# Patient Record
Sex: MALE | Race: WHITE | NOT HISPANIC OR LATINO
[De-identification: names, ages, dates, MRNs, and addresses within clinical notes are randomized per-mention and may not be internally consistent; named-entity substitution may affect disease eponyms.]

---

## 2017-09-15 ENCOUNTER — MOBILE ON CALL (OUTPATIENT)
Age: 22
End: 2017-09-15

## 2017-09-30 ENCOUNTER — APPOINTMENT (OUTPATIENT)
Dept: OTOLARYNGOLOGY | Facility: CLINIC | Age: 22
End: 2017-09-30
Payer: COMMERCIAL

## 2017-09-30 VITALS
WEIGHT: 190 LBS | HEIGHT: 71 IN | HEART RATE: 67 BPM | BODY MASS INDEX: 26.6 KG/M2 | DIASTOLIC BLOOD PRESSURE: 62 MMHG | SYSTOLIC BLOOD PRESSURE: 98 MMHG

## 2017-09-30 PROCEDURE — 92504 EAR MICROSCOPY EXAMINATION: CPT

## 2017-09-30 PROCEDURE — 99213 OFFICE O/P EST LOW 20 MIN: CPT | Mod: 25

## 2018-12-16 ENCOUNTER — TRANSCRIPTION ENCOUNTER (OUTPATIENT)
Age: 23
End: 2018-12-16

## 2020-03-19 ENCOUNTER — EMERGENCY (EMERGENCY)
Facility: HOSPITAL | Age: 25
LOS: 1 days | Discharge: HOME | End: 2020-03-19
Attending: EMERGENCY MEDICINE
Payer: COMMERCIAL

## 2020-03-19 VITALS
DIASTOLIC BLOOD PRESSURE: 81 MMHG | SYSTOLIC BLOOD PRESSURE: 124 MMHG | HEART RATE: 96 BPM | RESPIRATION RATE: 18 BRPM | WEIGHT: 223.11 LBS | TEMPERATURE: 99 F | HEIGHT: 70 IN | OXYGEN SATURATION: 99 %

## 2020-03-19 DIAGNOSIS — Z98.89 OTHER SPECIFIED POSTPROCEDURAL STATES: Chronic | ICD-10-CM

## 2020-03-19 DIAGNOSIS — Z96.22 MYRINGOTOMY TUBE(S) STATUS: Chronic | ICD-10-CM

## 2020-03-19 LAB
ALBUMIN SERPL ELPH-MCNC: 4.8 G/DL — SIGNIFICANT CHANGE UP (ref 3.3–5)
ALP SERPL-CCNC: 82 U/L — SIGNIFICANT CHANGE UP (ref 40–120)
ALT FLD-CCNC: 15 U/L — SIGNIFICANT CHANGE UP (ref 10–45)
ANION GAP SERPL CALC-SCNC: 14 MMOL/L — SIGNIFICANT CHANGE UP (ref 5–17)
AST SERPL-CCNC: 31 U/L — SIGNIFICANT CHANGE UP (ref 10–40)
BASOPHILS # BLD AUTO: 0.02 K/UL — SIGNIFICANT CHANGE UP (ref 0–0.2)
BASOPHILS NFR BLD AUTO: 0.3 % — SIGNIFICANT CHANGE UP (ref 0–2)
BILIRUB SERPL-MCNC: 0.3 MG/DL — SIGNIFICANT CHANGE UP (ref 0.2–1.2)
BUN SERPL-MCNC: 12 MG/DL — SIGNIFICANT CHANGE UP (ref 7–23)
CALCIUM SERPL-MCNC: 9.5 MG/DL — SIGNIFICANT CHANGE UP (ref 8.4–10.5)
CHLORIDE SERPL-SCNC: 103 MMOL/L — SIGNIFICANT CHANGE UP (ref 96–108)
CO2 SERPL-SCNC: 24 MMOL/L — SIGNIFICANT CHANGE UP (ref 22–31)
CREAT SERPL-MCNC: 0.81 MG/DL — SIGNIFICANT CHANGE UP (ref 0.5–1.3)
EOSINOPHIL # BLD AUTO: 0.09 K/UL — SIGNIFICANT CHANGE UP (ref 0–0.5)
EOSINOPHIL NFR BLD AUTO: 1.2 % — SIGNIFICANT CHANGE UP (ref 0–6)
ERYTHROCYTE [SEDIMENTATION RATE] IN BLOOD: 2 MM/HR — SIGNIFICANT CHANGE UP (ref 0–15)
GLUCOSE SERPL-MCNC: 94 MG/DL — SIGNIFICANT CHANGE UP (ref 70–99)
HCT VFR BLD CALC: 49.8 % — SIGNIFICANT CHANGE UP (ref 39–50)
HGB BLD-MCNC: 16.8 G/DL — SIGNIFICANT CHANGE UP (ref 13–17)
IMM GRANULOCYTES NFR BLD AUTO: 0.4 % — SIGNIFICANT CHANGE UP (ref 0–1.5)
LYMPHOCYTES # BLD AUTO: 1.44 K/UL — SIGNIFICANT CHANGE UP (ref 1–3.3)
LYMPHOCYTES # BLD AUTO: 19.7 % — SIGNIFICANT CHANGE UP (ref 13–44)
MCHC RBC-ENTMCNC: 32.2 PG — SIGNIFICANT CHANGE UP (ref 27–34)
MCHC RBC-ENTMCNC: 33.7 GM/DL — SIGNIFICANT CHANGE UP (ref 32–36)
MCV RBC AUTO: 95.4 FL — SIGNIFICANT CHANGE UP (ref 80–100)
MONOCYTES # BLD AUTO: 0.56 K/UL — SIGNIFICANT CHANGE UP (ref 0–0.9)
MONOCYTES NFR BLD AUTO: 7.7 % — SIGNIFICANT CHANGE UP (ref 2–14)
NEUTROPHILS # BLD AUTO: 5.17 K/UL — SIGNIFICANT CHANGE UP (ref 1.8–7.4)
NEUTROPHILS NFR BLD AUTO: 70.7 % — SIGNIFICANT CHANGE UP (ref 43–77)
NRBC # BLD: 0 /100 WBCS — SIGNIFICANT CHANGE UP (ref 0–0)
PLATELET # BLD AUTO: 211 K/UL — SIGNIFICANT CHANGE UP (ref 150–400)
POTASSIUM SERPL-MCNC: 4.6 MMOL/L — SIGNIFICANT CHANGE UP (ref 3.5–5.3)
POTASSIUM SERPL-SCNC: 4.6 MMOL/L — SIGNIFICANT CHANGE UP (ref 3.5–5.3)
PROCALCITONIN SERPL-MCNC: 0.02 NG/ML — SIGNIFICANT CHANGE UP (ref 0.02–0.1)
PROT SERPL-MCNC: 7.5 G/DL — SIGNIFICANT CHANGE UP (ref 6–8.3)
RBC # BLD: 5.22 M/UL — SIGNIFICANT CHANGE UP (ref 4.2–5.8)
RBC # FLD: 11.9 % — SIGNIFICANT CHANGE UP (ref 10.3–14.5)
SODIUM SERPL-SCNC: 141 MMOL/L — SIGNIFICANT CHANGE UP (ref 135–145)
WBC # BLD: 7.31 K/UL — SIGNIFICANT CHANGE UP (ref 3.8–10.5)
WBC # FLD AUTO: 7.31 K/UL — SIGNIFICANT CHANGE UP (ref 3.8–10.5)

## 2020-03-19 PROCEDURE — 99220: CPT

## 2020-03-19 PROCEDURE — 70481 CT ORBIT/EAR/FOSSA W/DYE: CPT | Mod: 26

## 2020-03-19 RX ORDER — SODIUM CHLORIDE 9 MG/ML
1000 INJECTION, SOLUTION INTRAVENOUS
Refills: 0 | Status: DISCONTINUED | OUTPATIENT
Start: 2020-03-19 | End: 2020-03-23

## 2020-03-19 RX ADMIN — SODIUM CHLORIDE 150 MILLILITER(S): 9 INJECTION, SOLUTION INTRAVENOUS at 20:50

## 2020-03-19 NOTE — ED ADULT NURSE NOTE - OBJECTIVE STATEMENT
24 y M arrived to the ED c/o L ear pain. Pt states " I have been having L ear pain, with drainage behind the ear, and hearing loss. I have been taking abx for this and has had this complaint for 9 days." PMH Left tympanomastoidectomy in 2015, cholesteatoma w/excision (2015), myringotomy. Pt denies chest pain, SOB, HA, N/V/D, abdominal pain, fever/chills, urinary symptoms, hematuria, weakness, dizziness, numbness, tinging. Peripheral pulses present b/l. Skin warm, dry and pink.

## 2020-03-19 NOTE — ED PROVIDER NOTE - OBJECTIVE STATEMENT
24y m PMHx cholesteatoma w/excision (2015), myringotomy p/w left ear pain/discharge. Pt reports left ear pain, loss of hearing and discharge from behind his left ear. The drainage began spontaneously, without preceding trauma he recalls. He was given Ofloxacin and Augmentin, she has been compliant with for 9 days. He had a left tympanomastoidectomy in 2015 ([facility] Harp). Pt declining pain meds in ED. Pt denies headache, fever, chills, congestion, runny nose, dizziness, vertigo, difficulty swallowing.

## 2020-03-19 NOTE — ED PROVIDER NOTE - CLINICAL SUMMARY MEDICAL DECISION MAKING FREE TEXT BOX
see MD note see MD note    Tympanomastoidectomy with ossiculoplasty  08/21/2015  -- CHRISTEN lopez/ Dr Harp

## 2020-03-19 NOTE — ED PROVIDER NOTE - PROGRESS NOTE DETAILS
Case endorsed to CDU ROSA Arrieta - pt may be appropriate CDU candidate for mastoiditis. ENT consulted. Labs/IVF/CT pending. - Sergey Estrada PA-C Case d/w ENT - aware of pt will see in ED - Sergey Estrada PA-C D/w ENT - pt was seen and evaluated by Dr Fu - recommended DELLA taylor, CDU for observation - Sergey Estrada PA-C

## 2020-03-19 NOTE — ED ADULT NURSE NOTE - NSIMPLEMENTINTERV_GEN_ALL_ED
Implemented All Universal Safety Interventions:  [place] to call system. Call bell, personal items and telephone within reach. Instruct patient to call for assistance. Room bathroom lighting operational. Non-slip footwear when patient is off stretcher. Physically safe environment: no spills, clutter or unnecessary equipment. Stretcher in lowest position, wheels locked, appropriate side rails in place.

## 2020-03-19 NOTE — ED PROVIDER NOTE - ATTENDING CONTRIBUTION TO CARE
------------ATTENDING NOTE------------   pt w/ sister c/o one week of increased redness/swelling and moderate ache behind L ear, now w/ spontaneous draining, complicated as past infection/surgery to area (? mastoiditis), has been on Augmentin and Cipro Otic gtt for past 4 days, no fevers, no additional complaints, awaiting labs/imaging and ENT consult -->  - Jessee Ruiz MD   ---------------------------------------------- ------------ATTENDING NOTE------------   pt w/ sister c/o one week of increased redness/swelling and moderate ache behind L ear, now w/ spontaneous draining, complicated as past infection/surgery to area (cholesteatoma), has been on Augmentin and Cipro Otic gtt for past 4 days, no fevers, no additional complaints, awaiting labs/imaging and ENT consult -->  - Jessee Ruiz MD   ---------------------------------------------- ------------ATTENDING NOTE------------   pt w/ sister c/o one week of increased redness/swelling and moderate ache behind L ear, now w/ spontaneous draining, complicated as past infection/surgery to area (cholesteatoma), has been on Augmentin and Cipro Otic gtt for past 9 days w/ worsening symptoms, no fevers, no additional complaints, awaiting labs/imaging and ENT consult -->  - Jessee Ruiz MD   ----------------------------------------------

## 2020-03-20 VITALS
TEMPERATURE: 98 F | HEART RATE: 78 BPM | DIASTOLIC BLOOD PRESSURE: 64 MMHG | SYSTOLIC BLOOD PRESSURE: 100 MMHG | OXYGEN SATURATION: 98 % | RESPIRATION RATE: 18 BRPM

## 2020-03-20 LAB
ANION GAP SERPL CALC-SCNC: 12 MMOL/L — SIGNIFICANT CHANGE UP (ref 5–17)
ANION GAP SERPL CALC-SCNC: 15 MMOL/L — SIGNIFICANT CHANGE UP (ref 5–17)
BUN SERPL-MCNC: 10 MG/DL — SIGNIFICANT CHANGE UP (ref 7–23)
BUN SERPL-MCNC: 12 MG/DL — SIGNIFICANT CHANGE UP (ref 7–23)
CALCIUM SERPL-MCNC: 8.3 MG/DL — LOW (ref 8.4–10.5)
CALCIUM SERPL-MCNC: 8.8 MG/DL — SIGNIFICANT CHANGE UP (ref 8.4–10.5)
CHLORIDE SERPL-SCNC: 101 MMOL/L — SIGNIFICANT CHANGE UP (ref 96–108)
CHLORIDE SERPL-SCNC: 105 MMOL/L — SIGNIFICANT CHANGE UP (ref 96–108)
CO2 SERPL-SCNC: 21 MMOL/L — LOW (ref 22–31)
CO2 SERPL-SCNC: 25 MMOL/L — SIGNIFICANT CHANGE UP (ref 22–31)
CREAT SERPL-MCNC: 0.64 MG/DL — SIGNIFICANT CHANGE UP (ref 0.5–1.3)
CREAT SERPL-MCNC: 0.78 MG/DL — SIGNIFICANT CHANGE UP (ref 0.5–1.3)
CRP SERPL-MCNC: <0.1 MG/DL — SIGNIFICANT CHANGE UP (ref 0–0.4)
GLUCOSE SERPL-MCNC: 281 MG/DL — HIGH (ref 70–99)
GLUCOSE SERPL-MCNC: 95 MG/DL — SIGNIFICANT CHANGE UP (ref 70–99)
POTASSIUM SERPL-MCNC: 3.7 MMOL/L — SIGNIFICANT CHANGE UP (ref 3.5–5.3)
POTASSIUM SERPL-MCNC: 3.9 MMOL/L — SIGNIFICANT CHANGE UP (ref 3.5–5.3)
POTASSIUM SERPL-SCNC: 3.7 MMOL/L — SIGNIFICANT CHANGE UP (ref 3.5–5.3)
POTASSIUM SERPL-SCNC: 3.9 MMOL/L — SIGNIFICANT CHANGE UP (ref 3.5–5.3)
SODIUM SERPL-SCNC: 137 MMOL/L — SIGNIFICANT CHANGE UP (ref 135–145)
SODIUM SERPL-SCNC: 142 MMOL/L — SIGNIFICANT CHANGE UP (ref 135–145)

## 2020-03-20 PROCEDURE — 80048 BASIC METABOLIC PNL TOTAL CA: CPT

## 2020-03-20 PROCEDURE — G0378: CPT

## 2020-03-20 PROCEDURE — 31233 NSL/SINS NDSC DX MAX SINUSC: CPT

## 2020-03-20 PROCEDURE — 99285 EMERGENCY DEPT VISIT HI MDM: CPT | Mod: 25

## 2020-03-20 PROCEDURE — 31575 DIAGNOSTIC LARYNGOSCOPY: CPT

## 2020-03-20 PROCEDURE — 85652 RBC SED RATE AUTOMATED: CPT

## 2020-03-20 PROCEDURE — 85027 COMPLETE CBC AUTOMATED: CPT

## 2020-03-20 PROCEDURE — 70481 CT ORBIT/EAR/FOSSA W/DYE: CPT

## 2020-03-20 PROCEDURE — 86140 C-REACTIVE PROTEIN: CPT

## 2020-03-20 PROCEDURE — 80053 COMPREHEN METABOLIC PANEL: CPT

## 2020-03-20 PROCEDURE — 96365 THER/PROPH/DIAG IV INF INIT: CPT

## 2020-03-20 PROCEDURE — 99217: CPT

## 2020-03-20 PROCEDURE — 84145 PROCALCITONIN (PCT): CPT

## 2020-03-20 PROCEDURE — 96375 TX/PRO/DX INJ NEW DRUG ADDON: CPT | Mod: XU

## 2020-03-20 PROCEDURE — 96376 TX/PRO/DX INJ SAME DRUG ADON: CPT | Mod: XU

## 2020-03-20 PROCEDURE — 96374 THER/PROPH/DIAG INJ IV PUSH: CPT | Mod: XU

## 2020-03-20 RX ORDER — CIPROFLOXACIN AND DEXAMETHASONE 3; 1 MG/ML; MG/ML
4 SUSPENSION/ DROPS AURICULAR (OTIC)
Qty: 56 | Refills: 0
Start: 2020-03-20 | End: 2020-03-26

## 2020-03-20 RX ORDER — MOXIFLOXACIN HYDROCHLORIDE TABLETS, 400 MG 400 MG/1
1 TABLET, FILM COATED ORAL
Qty: 14 | Refills: 0
Start: 2020-03-20 | End: 2020-03-26

## 2020-03-20 RX ORDER — OFLOXACIN OTIC SOLUTION 3 MG/ML
4 SOLUTION/ DROPS AURICULAR (OTIC)
Refills: 0 | Status: DISCONTINUED | OUTPATIENT
Start: 2020-03-20 | End: 2020-03-20

## 2020-03-20 RX ORDER — OFLOXACIN OTIC SOLUTION 3 MG/ML
4 SOLUTION/ DROPS AURICULAR (OTIC)
Refills: 0 | Status: DISCONTINUED | OUTPATIENT
Start: 2020-03-20 | End: 2020-03-23

## 2020-03-20 RX ORDER — CIPROFLOXACIN AND DEXAMETHASONE 3; 1 MG/ML; MG/ML
4 SUSPENSION/ DROPS AURICULAR (OTIC)
Refills: 0 | Status: DISCONTINUED | OUTPATIENT
Start: 2020-03-20 | End: 2020-03-23

## 2020-03-20 RX ADMIN — Medication 100 MILLIGRAM(S): at 20:14

## 2020-03-20 RX ADMIN — OFLOXACIN OTIC SOLUTION 4 DROP(S): 3 SOLUTION/ DROPS AURICULAR (OTIC) at 01:41

## 2020-03-20 RX ADMIN — SODIUM CHLORIDE 150 MILLILITER(S): 9 INJECTION, SOLUTION INTRAVENOUS at 08:29

## 2020-03-20 RX ADMIN — Medication 100 MILLIGRAM(S): at 12:29

## 2020-03-20 RX ADMIN — OFLOXACIN OTIC SOLUTION 4 DROP(S): 3 SOLUTION/ DROPS AURICULAR (OTIC) at 18:21

## 2020-03-20 RX ADMIN — SODIUM CHLORIDE 150 MILLILITER(S): 9 INJECTION, SOLUTION INTRAVENOUS at 20:14

## 2020-03-20 RX ADMIN — SODIUM CHLORIDE 1000 MILLILITER(S): 9 INJECTION, SOLUTION INTRAVENOUS at 20:00

## 2020-03-20 NOTE — CONSULT NOTE ADULT - ASSESSMENT
Assessment:  The patient is a 24 year old male with a past medical history significant for cholesteatoma w/excision (2015), myringotomy presents to the emergency room at [facility] with a chief complaint of left ear and face pain for the past several hours. Ddx: left otitis externa versus left otomastoiditis with TM perforation.    Plan:  1) levoflorxacin or clindamycin  2) ciprodex ear drops to left ear BID x 7 days  3) CDU for IV abx

## 2020-03-20 NOTE — CONSULT NOTE ADULT - ENT GEN HX ROS MEA POS PC
hearing difficulty/nasal discharge/post-nasal discharge/sinus symptoms/nasal congestion/dysphagia/nasal obstruction/recurrent cold sores/ear pain

## 2020-03-20 NOTE — ED CDU PROVIDER SUBSEQUENT DAY NOTE - PROGRESS NOTE DETAILS
Pt comfortable. No complaints. Vital signs stable. Will continue to observe and reassess. Awaiting Pt comfortable. No complaints. Vital signs stable. Will continue IV antibiotics and continue to observe and reassess.  -Mary Ellen Booth PA-C Pt comfortable. No complaints. Vital signs stable. +spontaneous purulent drainage from posterior L ear, +mild tenderness to L mastoid, L ear canal with purulent drainage, no visualization of TM.  Will continue IV antibiotics and continue to observe and reassess. ENT to re-evaluate.  -Mary Ellen Booth PA-C Pt comfortable. No complaints. Vital signs stable. Will continue to observe and reassess. Re-evaluated by ENT PA. Pt to continue IV antibiotics and ENT attending will come re-evaluate pt as well. -Mary Ellen Booth PA-C Pt comfortable. No complaints. Vital signs stable. Will continue to observe and reassess. Awaiting ENT Dr. Fu. -Mary Ellen Booth PA-C CDU NOTE ROSA PHILIP: Pt resting comfortably, feeling well without complaint. NAD, VSS. R ear normal, L ear TM perforated with pus, mastoid with abscess and minimal drainage of pus visible on dressing, + ttp. no cervical LAD b/l.  will continue antibiotics and monitoring. CDU NOTE ROSA PHILIP: ENT Dr. Fu saw pt, pt stable for discharge with cipro and ciprodex drops. case and plan discussed with Dr. Ferraro; agree with discharge.

## 2020-03-20 NOTE — ED ADULT NURSE REASSESSMENT NOTE - NS ED NURSE REASSESS COMMENT FT1
Report taken from Chiqui VILLARREAL. States patient had a good night with no complaints. Will continue to monitor.

## 2020-03-20 NOTE — ED CDU PROVIDER SUBSEQUENT DAY NOTE - ATTENDING CONTRIBUTION TO CARE
Attending MD Brandon:   I personally have seen and examined this patient.  Physician assistant note reviewed and agree on plan of care and except where noted.  See below for details.     Seen in CDU 8    24M with PMH/PSH including cholesteatoma of middle ear s/p excision (2015), (s/p tympanomastoidectomy in 2015, CHRISTEN Harp), s/p myringotomy sent to the CDU after presenting to the ED with L ear pain and discharge from behind L ear.  Review of EMR reveals patient reported Ofloxacin and Augmentin x 9 days prior to presenting to ED.  In ED, patient seen by Dr. Fu of ENT who recommended Levofloxacin or Clindamycin and Ciprodex ear drops to left ear BID x 7 days.  Patient CT of IAC revealed: 1.  1.2 x 1.3 cm superficial abscess at the left mastoid tip with associated left-sided otitis externa, 2. s/p left-sided canal wall up mastoidectomy with abnormal soft tissue completely filling the mastoidectomy bowl with associated erosive changes involving the bony external auditory canal, 3. s/p left-sided partial ossicular reconstruction procedure without evidence of prosthetic dislocation, 4. abnormal soft tissue within the left middle ear cavity with associated erosive changes involving the malleus. Additional bony thinning/questionable dehiscence of the left tegmen tympani. Findings are most reflective of changes related to chronic otitis media. Superimposed cholesteatoma formation is a possibility.  5. Chronic right-sided otitis media and/or chronic mastoiditis. No gross ossicular erosion on the right.  Patient without leukocytosis or elev ESR.  Patient pending re-eval by ENT later today.  Patient reports residual L ear pain.  Reports discharge from behind L ear.  Denies fevers, chills.  Denies abdominal pain, nausea, vomiting, diarrhea, blood in stools. Denies dysuria, hematuria, change in urinary habits including frequency, urgency.  Denies tobacco/ecig/vaping.  On exam, NAD, head NCAT, +spontaneously draining of purulent material from area posterior to the L ear, +mild tenderness to L mastoid, L ear with purulent drainage, unable to visualize beyond purulent drainage, PERRL, FROM at neck, no tenderness to midline palpation, no stepoffs along length of spine, lungs CTAB with good inspiratory effort, no wheezing, no rhonchi, no rales, +S1S2, no m/r/g, abdomen soft with +BS, NT, ND, no CVAT, moving all extremities; A/P: 24M with abscess spontaneously drainage and suspected L TM perf with purulent drainage, given ENT attdg recs will give Clinda and Rx Ciprodex, will await re-eval by ENT

## 2020-03-20 NOTE — ED CDU PROVIDER DISPOSITION NOTE - CLINICAL COURSE
24y m PMHx cholesteatoma w/excision (2015), myringotomy p/w left ear pain/discharge. Pt reports left ear pain, loss of hearing and discharge from behind his left ear. The drainage began spontaneously, without preceding trauma he recalls. He was given Ofloxacin and Augmentin, she has been compliant with for 9 days. He had a left tympanomastoidectomy in 2015 ([facility] Dr. Hapr). Pt declining pain meds in ED. Pt denies headache, fever, chills, congestion, runny nose, dizziness, vertigo, difficulty swallowing.     In ED pts labs unremarkable, pt seen by ENT Dr. Fu who recommended levaquin 750ng QD and ofloxacin drops 4 drops left ear BID and CT EAC. CT significant for inflammation of EAC, ? erosion of left ossicles, middles ear and mastoid opacification. Pt sent to CDU for IV abx, and reeval by ENT in the am.     In CDU_________. 24y m PMHx cholesteatoma w/excision (2015), myringotomy p/w left ear pain/discharge. Pt reports left ear pain, loss of hearing and discharge from behind his left ear. The drainage began spontaneously, without preceding trauma he recalls. He was given Ofloxacin and Augmentin, she has been compliant with for 9 days. He had a left tympanomastoidectomy in 2015 ([facility] Dr. Harp). Pt declining pain meds in ED. Pt denies headache, fever, chills, congestion, runny nose, dizziness, vertigo, difficulty swallowing.     In ED pts labs unremarkable, pt seen by ENT Dr. Fu who recommended levaquin 750ng QD and ofloxacin drops 4 drops left ear BID and CT EAC. CT significant for inflammation of EAC, ? erosion of left ossicles, middles ear and mastoid opacification. Pt sent to CDU for IV abx, and reeval by ENT in the am.     In CDU, pts pain improved, ENT Dr. Fu saw pt, pt stable for d/c on cipro and ciprodex.

## 2020-03-20 NOTE — CONSULT NOTE ADULT - NOSE
clear discharge/inflamed mucosa/congestion/nasal/sinus endoscopy: maxillary sinus with mucoid fluid bl via inferior meatus, ss clear b/l

## 2020-03-20 NOTE — ED CDU PROVIDER DISPOSITION NOTE - ATTENDING CONTRIBUTION TO CARE
Attending MD Brandon:   I personally have seen and examined this patient.  Physician assistant note reviewed and agree on plan of care and except where noted.  See below for details.     Seen in CDU 8    24M with PMH/PSH including cholesteatoma of middle ear s/p excision (2015), (s/p tympanomastoidectomy in 2015, CHRISTEN Harp), s/p myringotomy sent to the CDU after presenting to the ED with L ear pain and discharge from behind L ear.  Review of EMR reveals patient reported Ofloxacin and Augmentin x 9 days prior to presenting to ED.  In ED, patient seen by Dr. Fu of ENT who recommended Levofloxacin 750mg daily, Ofloxacin gtts BID.  Patient CT of IAC revealed: 1.  1.2 x 1.3 cm superficial abscess at the left mastoid tip with associated left-sided otitis externa, 2. s/p left-sided canal wall up mastoidectomy with abnormal soft tissue completely filling the mastoidectomy bowl with associated erosive changes involving the bony external auditory canal, 3. s/p left-sided partial ossicular reconstruction procedure without evidence of prosthetic dislocation, 4. abnormal soft tissue within the left middle ear cavity with associated erosive changes involving the malleus. Additional bony thinning/questionable dehiscence of the left tegmen tympani. Findings are most reflective of changes related to chronic otitis media. Superimposed cholesteatoma formation is a possibility.  5. Chronic right-sided otitis media and/or chronic mastoiditis. No gross ossicular erosion on the right.  Patient without leukocytosis or elev ESR.  Patient pending re-eval by ENT later today.    TO BE COMPLETED Attending MD Brandon:   I personally have seen and examined this patient.  Physician assistant note reviewed and agree on plan of care and except where noted.  See below for details.     Seen in CDU 8    24M with PMH/PSH including cholesteatoma of middle ear s/p excision (2015), (s/p tympanomastoidectomy in 2015, CHRISTEN Harp), s/p myringotomy sent to the CDU after presenting to the ED with L ear pain and discharge from behind L ear.  Review of EMR reveals patient reported Ofloxacin and Augmentin x 9 days prior to presenting to ED.  In ED, patient seen by Dr. Fu of ENT who recommended Levofloxacin or Clindamycin and Ciprodex ear drops to left ear BID x 7 days.  Patient CT of IAC revealed: 1.  1.2 x 1.3 cm superficial abscess at the left mastoid tip with associated left-sided otitis externa, 2. s/p left-sided canal wall up mastoidectomy with abnormal soft tissue completely filling the mastoidectomy bowl with associated erosive changes involving the bony external auditory canal, 3. s/p left-sided partial ossicular reconstruction procedure without evidence of prosthetic dislocation, 4. abnormal soft tissue within the left middle ear cavity with associated erosive changes involving the malleus. Additional bony thinning/questionable dehiscence of the left tegmen tympani. Findings are most reflective of changes related to chronic otitis media. Superimposed cholesteatoma formation is a possibility.  5. Chronic right-sided otitis media and/or chronic mastoiditis. No gross ossicular erosion on the right.  Patient without leukocytosis or elev ESR.  Patient pending re-eval by ENT later today.    TO BE COMPLETED Attending MD Brandon:   I personally have seen and examined this patient.  Physician assistant note reviewed and agree on plan of care and except where noted.  See below for details.     Seen in CDU 8    24M with PMH/PSH including cholesteatoma of middle ear s/p excision (2015), (s/p tympanomastoidectomy in 2015, CHRISTEN Harp), s/p myringotomy sent to the CDU after presenting to the ED with L ear pain and discharge from behind L ear.  Review of EMR reveals patient reported Ofloxacin and Augmentin x 9 days prior to presenting to ED.  In ED, patient seen by Dr. Fu of ENT who recommended Levofloxacin or Clindamycin and Ciprodex ear drops to left ear BID x 7 days.  Patient CT of IAC revealed: 1.  1.2 x 1.3 cm superficial abscess at the left mastoid tip with associated left-sided otitis externa, 2. s/p left-sided canal wall up mastoidectomy with abnormal soft tissue completely filling the mastoidectomy bowl with associated erosive changes involving the bony external auditory canal, 3. s/p left-sided partial ossicular reconstruction procedure without evidence of prosthetic dislocation, 4. abnormal soft tissue within the left middle ear cavity with associated erosive changes involving the malleus. Additional bony thinning/questionable dehiscence of the left tegmen tympani. Findings are most reflective of changes related to chronic otitis media. Superimposed cholesteatoma formation is a possibility.  5. Chronic right-sided otitis media and/or chronic mastoiditis. No gross ossicular erosion on the right.  Patient without leukocytosis or elev ESR.  Patient pending re-eval by ENT later today.  Patient reports residual L ear pain.  Reports discharge from behind L ear.  Denies fevers, chills.  Denies abdominal pain, nausea, vomiting, diarrhea, blood in stools. Denies dysuria, hematuria, change in urinary habits including frequency, urgency.  On exam, NAD, head NCAT, +spontaneously draining of purulent material from area posterior to the L ear, +mild tenderness to L mastoid, L ear with purulent drainage, unable to visualize beyond purulent drainage, PERRL, FROM at neck, no tenderness to midline palpation, no stepoffs along length of spine, lungs CTAB with good inspiratory effort, no wheezing, no rhonchi, no rales, +S1S2, no m/r/g, abdomen soft with +BS, NT, ND, no CVAT, moving all extremities; A/P: 24M with abscess spontaneously drainage and suspected L TM perf with purulent drainage,     TO BE COMPLETED Attending MD Brandon:   I personally have seen and examined this patient.  Physician assistant note reviewed and agree on plan of care and except where noted.  See below for details.     Seen in CDU 8    24M with PMH/PSH including cholesteatoma of middle ear s/p excision (2015), (s/p tympanomastoidectomy in 2015, CHRISTEN Harp), s/p myringotomy sent to the CDU after presenting to the ED with L ear pain and discharge from behind L ear.  Review of EMR reveals patient reported Ofloxacin and Augmentin x 9 days prior to presenting to ED.  In ED, patient seen by Dr. Fu of ENT who recommended Levofloxacin or Clindamycin and Ciprodex ear drops to left ear BID x 7 days.  Patient CT of IAC revealed: 1.  1.2 x 1.3 cm superficial abscess at the left mastoid tip with associated left-sided otitis externa, 2. s/p left-sided canal wall up mastoidectomy with abnormal soft tissue completely filling the mastoidectomy bowl with associated erosive changes involving the bony external auditory canal, 3. s/p left-sided partial ossicular reconstruction procedure without evidence of prosthetic dislocation, 4. abnormal soft tissue within the left middle ear cavity with associated erosive changes involving the malleus. Additional bony thinning/questionable dehiscence of the left tegmen tympani. Findings are most reflective of changes related to chronic otitis media. Superimposed cholesteatoma formation is a possibility.  5. Chronic right-sided otitis media and/or chronic mastoiditis. No gross ossicular erosion on the right.  Patient without leukocytosis or elev ESR.  Patient pending re-eval by ENT later today.  Patient reports residual L ear pain.  Reports discharge from behind L ear.  Denies fevers, chills.  Denies abdominal pain, nausea, vomiting, diarrhea, blood in stools. Denies dysuria, hematuria, change in urinary habits including frequency, urgency.  On exam, NAD, head NCAT, +spontaneously draining of purulent material from area posterior to the L ear, +mild tenderness to L mastoid, L ear with purulent drainage, unable to visualize beyond purulent drainage, PERRL, FROM at neck, no tenderness to midline palpation, no stepoffs along length of spine, lungs CTAB with good inspiratory effort, no wheezing, no rhonchi, no rales, +S1S2, no m/r/g, abdomen soft with +BS, NT, ND, no CVAT, moving all extremities; A/P: 24M with abscess spontaneously drainage and suspected L TM perf with purulent drainage, pending ENT re-eval I performed a history and physical exam of the patient and discussed their management with the Advanced Care Practitioner. I reviewed the ACP's note and agree with the documented findings and plan of care.

## 2020-03-20 NOTE — ED CDU PROVIDER INITIAL DAY NOTE - PROGRESS NOTE DETAILS
Spoke with Carlos Enrique, ENT, discussed CT findings which he states does not change possibility of pt being d/cd tomorrow. recommends ofloxacin drops to left ear 4 drops BID, and levaquin 750 QD, likely pt can go home tomorrow after reeval. will monitor closely. Pt resting comfortably. NAD. No complaints. VSS. will continue to monitor, pt without pain at this time, declining pain medications.

## 2020-03-20 NOTE — ED CDU PROVIDER DISPOSITION NOTE - NSFOLLOWUPINSTRUCTIONS_ED_ALL_ED_FT
- stay hydrated.   - take tylenol 975mg and ibuprofen 600mg every 6 hours as needed for pain-take with meals.  -take levaquin 750mg once a day as prescribed  -apply ofloxacin drops to left ear twice a day as prescribed.   - follow up with your pcp in 1-2 days.  - follow up with your ENT doctor this week  - return if symptoms worsen, fevers, weakness, numbness/tingling, blurred vision, difficulty ambulating and all other concerns. - stay hydrated.   - take tylenol 975mg and ibuprofen 600mg every 6 hours as needed for pain-take with meals.  -take antibiotic clindamycin as prescribed  -apply Ciprodex drops to left ear twice a day as prescribed.   - follow up with your medical doctor in 2-3 days.  - follow up with ENT specialist Dr. Fu in 2-3 days.  - return if symptoms worsen, fevers, weakness, numbness/tingling, blurred vision, difficulty ambulating and all other concerns. - stay hydrated.   - take tylenol 975mg and ibuprofen 600mg every 6 hours as needed for pain-take with meals.  - take antibiotic Cipro as prescribed  - apply Ciprodex drops to left ear twice a day as prescribed.   - follow up with your medical doctor in 2-3 days.  - follow up with ENT specialist Dr. Fu in 2-3 days.  - return if symptoms worsen, fevers, weakness, numbness/tingling, blurred vision, difficulty ambulating and all other concerns.

## 2020-03-20 NOTE — ED ADULT NURSE REASSESSMENT NOTE - COMFORT CARE
darkened lights/ambulated to bathroom/plan of care explained/meal provided/repositioned/warm blanket provided/po fluids offered
plan of care explained
plan of care explained/po fluids offered

## 2020-03-20 NOTE — CHART NOTE - NSCHARTNOTEFT_GEN_A_CORE
PROCEDURE NOTE - Drainage of Left Mastoid Abscess    Indication: Left Mastoid Abscess  	  Correct patient, procedure, site, positioning, and presence of correct supplies.    Procedure: Patient was positioned, prepped and draped in usual sterile fashion. Clamp inserted and maneuvered to break up any loculations. Manual pressure was used to remove the contents of the abscess cavity.     Blood loss: Minimal  Complications: None      ADDITIONAL IMAGING: CT IAC   IMPRESSION:     1. 1.2 x 1.3 cm superficial abscess at the left mastoid tip with associated   left-sided otitis externa.     2. Status post left-sided canal wall up mastoidectomy with abnormal soft   tissue completely filling the mastoidectomy bowl with associated erosive   changes involving the bony external auditory canal.     3. Status post left-sided partial ossicular reconstruction procedure without   evidence of prosthetic dislocation.     4. Abnormal soft tissue within the left middle ear cavity with associated   erosive changes involving the malleus. Additional bony thinning/questionable   dehiscence of the left tegmen tympani. Findings are most reflective of   changes related to chronic otitis media. Superimposed cholesteatoma   formation is a possibility.     5. Chronic right-sided otitis media and/or chronic mastoiditis. No gross   ossicular erosion on the right.         Plan:  Continue IV ABX in CDU   Dr. Fu will be back tonight to re-evaluate the pt

## 2020-03-20 NOTE — ED ADULT NURSE REASSESSMENT NOTE - NS ED NURSE REASSESS COMMENT FT1
Pt received from CHERELLE Haywood. Pt oriented to CDU & plan of care was discussed. Pt endoses 3/10 L ear pain. Swelling noted but no drainage noted. Open wound noted behind L ear. Safety & comfort measures maintained. Call bell in reach. Will continue to monitor.

## 2020-03-20 NOTE — ED ADULT NURSE REASSESSMENT NOTE - NS ED NURSE REASSESS COMMENT FT1
Received pt from CHERELLE Arenas , received pt alert and responsive, oriented x4, denies any respiratory distress, SOB, or difficulty breathing. Pt transferred to CDU for L ear decreased hearing and drainage., IV in place, patent and free of signs of infiltration, placed on continuos cardiac monitoring as ordered, NSR HR in the,  pt denies chest pain or palpitations, V/S stable, pt afebrile, pt denies pain at this time. Pt educated on unit and unit rules, instructed patient to notify RN of any needed assistance, Pt verbalizes understanding, Call bell placed within reach. Safety maintained. Will continue to monitor. Received pt from CHERELLE Arenas , received pt alert and responsive, oriented x4, denies any respiratory distress, SOB, or difficulty breathing. Pt transferred to CDU for L ear pain, L posterior ear drainage and decreased hearing to L ear. Currently pain is 1/10 to L inner ear, pt declined pain medication at this time, will reassess. Placed on lactated ringers at 150, pt tolerating well. Pending IV clindamycin q8 pt aware. IV in place, patent and free of signs of infiltration, V/S stable, pt afebrile. Pt educated on unit and unit rules, instructed patient to notify RN of any needed assistance, Pt verbalizes understanding, Call bell placed within reach. Safety maintained. Will continue to monitor.

## 2020-03-20 NOTE — CONSULT NOTE ADULT - COMMENTS
Vital Signs Last 24 Hrs  T(C): 36.5 (20 Mar 2020 04:38), Max: 37 (19 Mar 2020 19:27)  T(F): 97.7 (20 Mar 2020 04:38), Max: 98.6 (19 Mar 2020 19:27)  HR: 84 (20 Mar 2020 04:38) (80 - 96)  BP: 106/68 (20 Mar 2020 04:38) (106/68 - 124/81)  BP(mean): --  RR: 18 (20 Mar 2020 04:38) (16 - 18)  SpO2: 98% (20 Mar 2020 04:38) (97% - 99%)

## 2020-03-20 NOTE — ED CDU PROVIDER DISPOSITION NOTE - PATIENT PORTAL LINK FT
You can access the FollowMyHealth Patient Portal offered by [facility] by registering at the following website: [email or web address]. By joining [facility]’s FollowMyHealth portal, you will also be able to view your health information using other applications (apps) compatible with our system.

## 2020-03-20 NOTE — ED ADULT NURSE REASSESSMENT NOTE - NS ED NURSE REASSESS COMMENT FT1
Pt d/c home per CHRIS Recinos no complaints, IV lock removed, d/c paperwork given to pt  by PA. d/c paperwork given to pt, ambulated out of unit independently.

## 2020-03-20 NOTE — ED CDU PROVIDER INITIAL DAY NOTE - OBJECTIVE STATEMENT
24y m PMHx cholesteatoma w/excision (2015), myringotomy p/w left ear pain/discharge. Pt reports left ear pain, loss of hearing and discharge from behind his left ear. The drainage began spontaneously, without preceding trauma he recalls. He was given Ofloxacin and Augmentin, she has been compliant with for 9 days. He had a left tympanomastoidectomy in 2015 (CHRISTEN Harp). Pt declining pain meds in ED. Pt denies headache, fever, chills, congestion, runny nose, dizziness, vertigo, difficulty swallowing. 24y m PMHx cholesteatoma w/excision (2015), myringotomy p/w left ear pain/discharge. Pt reports left ear pain, loss of hearing and discharge from behind his left ear. The drainage began spontaneously, without preceding trauma he recalls. He was given Ofloxacin and Augmentin, she has been compliant with for 9 days. He had a left tympanomastoidectomy in 2015 ([facility] Dr. Harp). Pt declining pain meds in ED. Pt denies headache, fever, chills, congestion, runny nose, dizziness, vertigo, difficulty swallowing.     In ED pts labs unremarkable, pt seen by ENT Dr. Fu who recommended levaquin 750ng QD and ofloxacin drops 4 drops left ear BID and CT EAC. CT significant for inflammation of EAC, ? erosion of left ossicles, middles ear and mastoid opacification. Pt sent to CDU for IV abx, and reeval by ENT in the am.

## 2020-03-22 RX ORDER — OFLOXACIN OTIC SOLUTION 3 MG/ML
5 SOLUTION/ DROPS AURICULAR (OTIC)
Qty: 1 | Refills: 0
Start: 2020-03-22 | End: 2020-03-31

## 2020-03-27 ENCOUNTER — APPOINTMENT (OUTPATIENT)
Dept: OTOLARYNGOLOGY | Facility: CLINIC | Age: 25
End: 2020-03-27
Payer: COMMERCIAL

## 2020-03-27 VITALS
WEIGHT: 220 LBS | BODY MASS INDEX: 30.8 KG/M2 | HEART RATE: 87 BPM | HEIGHT: 71 IN | DIASTOLIC BLOOD PRESSURE: 67 MMHG | SYSTOLIC BLOOD PRESSURE: 106 MMHG

## 2020-03-27 VITALS — TEMPERATURE: 97.6 F | BODY MASS INDEX: 26.6 KG/M2 | WEIGHT: 190 LBS | HEIGHT: 71 IN

## 2020-03-27 PROCEDURE — 99213 OFFICE O/P EST LOW 20 MIN: CPT

## 2020-03-27 NOTE — HISTORY OF PRESENT ILLNESS
[de-identified] : 25 y/o M, S/P Left Canal wall up mastoidectomy for Cholesteatoma in 2015 by Dr. Harp.  Last week had Mastoid abscess that was I&D'ed by Dr. Fu in ED at [facility].\par Now here for f/u.  Notes pain is much improved, only mild tenderness.  Has crusting over site, minimal d/c. Feels swelling is much improved.  No F/C/S.\par Currently on Cipro, today is last day.  Also using Ofloxacin gtts. Not wearing his hearing aids.\par Scan in hospital also suggests possible right cholesteatoma in addition to left infection.

## 2020-03-27 NOTE — PHYSICAL EXAM
[Normal] : mucosa is normal [Midline] : trachea located in midline position [de-identified] : left - I&D side over mastoid with large crusting that was cleaned off revealing small mount of granulation tissue with minimal purulence drainage on pressing around it, mastoid is nontender with no edema/erythema/fluctuance.  left eac filled with purulent discharge and cerumen debris; this was all suctioned clear to TM. canal with erythema and edema especially along posterior wall with possible fluctuant area, right canal clear [de-identified] : left TM was partially visualized and thickened/erythematous, right TM intact with an effusion and sclerosis/opacity along posterior superior quadrant ?cholesteotoma

## 2020-03-27 NOTE — REASON FOR VISIT
[Subsequent Evaluation] : a subsequent evaluation for [FreeTextEntry2] : F/U Mastoid abscess drained in ED

## 2020-03-27 NOTE — ASSESSMENT
[FreeTextEntry1] : Left mastoid abscess s/p I&D by Dr. Fu, healing well.  Continues to have d/c and acute OM on left.  \par - Suctioned left canal clear in office today and cleaned mastoid I&D site which is healing well\par - Culture taken from left ear\par - avoid HA in left ear but ok to wear in right ear\par - Continue Ofloxacin gtts\par - PO Cipro x 1 more week\par - F/U with Dr. Harp next week to re-eval, call if any fever/pain or new symptoms\par \par Right effusion with possible cholesteatoma\par - F/U with Dr. Harp\par

## 2020-03-31 ENCOUNTER — APPOINTMENT (OUTPATIENT)
Dept: OTOLARYNGOLOGY | Facility: CLINIC | Age: 25
End: 2020-03-31
Payer: COMMERCIAL

## 2020-03-31 VITALS — TEMPERATURE: 97.5 F

## 2020-03-31 PROCEDURE — 92504 EAR MICROSCOPY EXAMINATION: CPT

## 2020-03-31 PROCEDURE — 99213 OFFICE O/P EST LOW 20 MIN: CPT | Mod: 25

## 2020-03-31 NOTE — PROCEDURE
[Same] : same as the Pre Op Dx. [] : Binocular Microscopy [FreeTextEntry1] : L OE [FreeTextEntry4] : none [FreeTextEntry6] : Operative microscope was used to examine/treat the ear canal, ear drum and visible middle ear landmarks. Adequate exam/treatment would not have been possible without the use of a microscope. Findings are described.\par \par \par \par

## 2020-03-31 NOTE — PHYSICAL EXAM
[Binocular Microscopic Exam] : Binocular microscopic exam was performed [FreeTextEntry7] : postauricular abscess, draining purulence, expressed [FreeTextEntry9] : erythema, polyp; cauterized, wick placed with ciprodex [de-identified] : thick [Normal] : no rashes

## 2020-03-31 NOTE — HISTORY OF PRESENT ILLNESS
[de-identified] : 25 year old man follow up after ear infection. Bilateral hearing loss, usually wears bilateral hearing aids.  [facility] went to PCP 3/12/2020 and was prescribed oral antibiotics and ear drops for left otalgia.   [facility] went to [facility] for a mastoid abscess which was I & D'd by Dr. Sang Fu on 3/19/2020.  [facility] had a cat scan in the hospital and was told something was seen in the right ear.  [facility] has not been wearing hearing aids lately.  \par S/p left tympanomastoidectomy with composite tragal cartilage graft, facial nerve monitoring, and an ossicular reconstruction with a total Go prosthesis 8/21/2015.  \par

## 2020-04-07 ENCOUNTER — LABORATORY RESULT (OUTPATIENT)
Age: 25
End: 2020-04-07

## 2020-04-07 ENCOUNTER — APPOINTMENT (OUTPATIENT)
Dept: OTOLARYNGOLOGY | Facility: CLINIC | Age: 25
End: 2020-04-07
Payer: COMMERCIAL

## 2020-04-07 VITALS — TEMPERATURE: 98.24 F

## 2020-04-07 PROCEDURE — 92504 EAR MICROSCOPY EXAMINATION: CPT

## 2020-04-07 PROCEDURE — 99213 OFFICE O/P EST LOW 20 MIN: CPT | Mod: 25

## 2020-04-07 NOTE — PHYSICAL EXAM
[Binocular Microscopic Exam] : Binocular microscopic exam was performed [Normal] : the right tympanic membrane was normal [FreeTextEntry7] : postauricular abscess, draining purulence, expressed [FreeTextEntry9] : wick removed, mild erythema, no polyp [de-identified] : thick

## 2020-04-07 NOTE — HISTORY OF PRESENT ILLNESS
[de-identified] : 25 year old man follow up after ear infection. Bilateral hearing loss, usually wears bilateral hearing aids.  [facility] went to PCP 3/12/2020 and was prescribed oral antibiotics and ear drops for left otalgia.   [facility] went to [facility] for a mastoid abscess which was I & D'd by Dr. Sang Fu on 3/19/2020.  [facility] had a cat scan in the hospital and was told something was seen in the right ear.  [facility] has not been wearing hearing aids lately.  \par S/p left tympanomastoidectomy with composite tragal cartilage graft, facial nerve monitoring, and an ossicular reconstruction with a total Go prosthesis 8/21/2015.  \par

## 2020-04-14 ENCOUNTER — APPOINTMENT (OUTPATIENT)
Dept: OTOLARYNGOLOGY | Facility: CLINIC | Age: 25
End: 2020-04-14
Payer: COMMERCIAL

## 2020-04-14 VITALS
WEIGHT: 220 LBS | HEART RATE: 87 BPM | SYSTOLIC BLOOD PRESSURE: 122 MMHG | DIASTOLIC BLOOD PRESSURE: 73 MMHG | BODY MASS INDEX: 30.8 KG/M2 | HEIGHT: 71 IN

## 2020-04-14 VITALS — TEMPERATURE: 98.3 F

## 2020-04-14 DIAGNOSIS — H66.92 OTITIS MEDIA, UNSPECIFIED, LEFT EAR: ICD-10-CM

## 2020-04-14 PROCEDURE — 99213 OFFICE O/P EST LOW 20 MIN: CPT | Mod: 25

## 2020-04-14 PROCEDURE — 92504 EAR MICROSCOPY EXAMINATION: CPT

## 2020-04-14 RX ORDER — CIPROFLOXACIN HYDROCHLORIDE 500 MG/1
500 TABLET, FILM COATED ORAL TWICE DAILY
Qty: 14 | Refills: 0 | Status: DISCONTINUED | COMMUNITY
Start: 2020-03-27 | End: 2020-04-14

## 2020-04-14 NOTE — REASON FOR VISIT
[Subsequent Evaluation] : a subsequent evaluation for [FreeTextEntry2] : follow up left ear infection

## 2020-04-14 NOTE — PHYSICAL EXAM
[Binocular Microscopic Exam] : Binocular microscopic exam was performed [FreeTextEntry7] : postauricular abscess resolved, raised erythematous lesion cauterized and dressing placed [FreeTextEntry9] : exudate cleared; PS granulation, tobradex placed on gelfoam [de-identified] : thick [Normal] : no rashes

## 2020-04-14 NOTE — PROCEDURE
[Same] : same as the Pre Op Dx. [] : Binocular Microscopy [FreeTextEntry1] : L cholesteatoma [FreeTextEntry4] : none [FreeTextEntry6] : Operative microscope was used to examine/treat the ear canal, ear drum and visible middle ear landmarks. Adequate exam/treatment would not have been possible without the use of a microscope. Findings are described.\par \par

## 2020-04-14 NOTE — HISTORY OF PRESENT ILLNESS
[de-identified] : 25 year old man follow up left ear infection.  States has been using mupricin daily as prescribed, states drainage is better, but still coming out.  States a little pain when wiping the area, otherwise no pain.  Last culture conducted 4/7/2020.\par S/p left tympanomastoidectomy with composite tragal cartilage graft, facial nerve monitoring, and an ossicular reconstruction with a total Go prosthesis 8/21/2015. \par

## 2020-04-27 LAB — FUNGUS FLD CULT: NORMAL

## 2020-06-04 DIAGNOSIS — Z01.818 ENCOUNTER FOR OTHER PREPROCEDURAL EXAMINATION: ICD-10-CM

## 2020-06-05 ENCOUNTER — APPOINTMENT (OUTPATIENT)
Dept: DISASTER EMERGENCY | Facility: CLINIC | Age: 25
End: 2020-06-05

## 2020-06-05 VITALS
DIASTOLIC BLOOD PRESSURE: 71 MMHG | TEMPERATURE: 98 F | WEIGHT: 214.95 LBS | OXYGEN SATURATION: 100 % | RESPIRATION RATE: 20 BRPM | HEIGHT: 71 IN | SYSTOLIC BLOOD PRESSURE: 121 MMHG | HEART RATE: 80 BPM

## 2020-06-05 LAB — SARS-COV-2 N GENE NPH QL NAA+PROBE: NOT DETECTED

## 2020-06-05 RX ORDER — LIDOCAINE HCL 20 MG/ML
0.2 VIAL (ML) INJECTION ONCE
Refills: 0 | Status: DISCONTINUED | OUTPATIENT
Start: 2020-06-08 | End: 2020-06-23

## 2020-06-05 RX ORDER — SODIUM CHLORIDE 9 MG/ML
3 INJECTION INTRAMUSCULAR; INTRAVENOUS; SUBCUTANEOUS EVERY 8 HOURS
Refills: 0 | Status: DISCONTINUED | OUTPATIENT
Start: 2020-06-08 | End: 2020-06-23

## 2020-06-05 NOTE — H&P PST ADULT - NSANTHOSAYNRD_GEN_A_CORE
No. ANALY screening performed.  STOP BANG Legend: 0-2 = LOW Risk; 3-4 = INTERMEDIATE Risk; 5-8 = HIGH Risk

## 2020-06-05 NOTE — H&P PST ADULT - NSICDXPASTMEDICALHX_GEN_ALL_CORE_FT
PAST MEDICAL HISTORY:  Cholesteatoma of middle ear     Conductive hearing loss     [place] (hard of hearing) PAST MEDICAL HISTORY:  Cholesteatoma of middle ear     Conductive hearing loss     H/O chronic otitis media     [place] (hard of hearing) Conductive hearing loss b/l    Left chronic otitis media     RACHEAL (middle ear effusion)

## 2020-06-05 NOTE — H&P PST ADULT - NSICDXPROBLEM_GEN_ALL_CORE_FT
PROBLEM DIAGNOSES  Problem: H/O otitis media  Assessment and Plan: Left Tympanomastoidectomy w/ossiculoplasty, facial nerve monitoring, cartilage graft 6/8/20

## 2020-06-05 NOTE — H&P PST ADULT - RS GEN PE MLT RESP DETAILS PC
breath sounds equal/clear to auscultation bilaterally/normal/airway patent/respirations non-labored/good air movement

## 2020-06-05 NOTE — H&P PST ADULT - HISTORY OF PRESENT ILLNESS
25 year old male with recurrent ear infection and hearing loss S/P Left Tympanomastoidectomy Cartilage Graft Possible Ossiculoplasty on 08/21/2015. Pt has new OE with polyp PA erythema/abscess, cultures with Peptostreptococcus treated with ABX 4/2020. Presents Left Tympanomastoidectomy w/ossiculoplasty, facialk nerve monitoring, cartilage graft 6/8/20 25 year old male with recurrent ear infection and hearing loss S/P Left Tympanomastoidectomy Cartilage Graft Possible Ossiculoplasty on 08/21/2015. Pt has new OE with polyp PA erythema/abscess, cultures with Peptostreptococcus treated with ABX 4/2020. Presents Left Tympanomastoidectomy w/ossiculoplasty, facial nerve monitoring, cartilage graft 6/8/20

## 2020-06-05 NOTE — H&P PST ADULT - NSICDXPASTSURGICALHX_GEN_ALL_CORE_FT
PAST SURGICAL HISTORY:  H/O tympanomastoidectomy     History of hip surgery right    S/P myringotomy with insertion of tube b/l 4yrs ago

## 2020-06-07 ENCOUNTER — TRANSCRIPTION ENCOUNTER (OUTPATIENT)
Age: 25
End: 2020-06-07

## 2020-06-08 ENCOUNTER — RESULT REVIEW (OUTPATIENT)
Age: 25
End: 2020-06-08

## 2020-06-08 ENCOUNTER — OUTPATIENT (OUTPATIENT)
Dept: OUTPATIENT SERVICES | Facility: HOSPITAL | Age: 25
LOS: 1 days | End: 2020-06-08
Payer: COMMERCIAL

## 2020-06-08 ENCOUNTER — APPOINTMENT (OUTPATIENT)
Dept: OTOLARYNGOLOGY | Facility: HOSPITAL | Age: 25
End: 2020-06-08

## 2020-06-08 VITALS
OXYGEN SATURATION: 97 % | RESPIRATION RATE: 15 BRPM | HEART RATE: 69 BPM | SYSTOLIC BLOOD PRESSURE: 114 MMHG | DIASTOLIC BLOOD PRESSURE: 70 MMHG

## 2020-06-08 DIAGNOSIS — Z96.22 MYRINGOTOMY TUBE(S) STATUS: Chronic | ICD-10-CM

## 2020-06-08 DIAGNOSIS — H66.92 OTITIS MEDIA, UNSPECIFIED, LEFT EAR: ICD-10-CM

## 2020-06-08 DIAGNOSIS — Z98.89 OTHER SPECIFIED POSTPROCEDURAL STATES: Chronic | ICD-10-CM

## 2020-06-08 DIAGNOSIS — Z98.890 OTHER SPECIFIED POSTPROCEDURAL STATES: Chronic | ICD-10-CM

## 2020-06-08 DIAGNOSIS — H71.92 UNSPECIFIED CHOLESTEATOMA, LEFT EAR: ICD-10-CM

## 2020-06-08 DIAGNOSIS — Z86.69 PERSONAL HISTORY OF OTHER DISEASES OF THE NERVOUS SYSTEM AND SENSE ORGANS: ICD-10-CM

## 2020-06-08 PROBLEM — H65.90 UNSPECIFIED NONSUPPURATIVE OTITIS MEDIA, UNSPECIFIED EAR: Chronic | Status: ACTIVE | Noted: 2020-06-05

## 2020-06-08 PROBLEM — H91.90 UNSPECIFIED HEARING LOSS, UNSPECIFIED EAR: Chronic | Status: ACTIVE | Noted: 2020-06-05

## 2020-06-08 PROCEDURE — 92516 FACIAL NERVE FUNCTION TEST: CPT

## 2020-06-08 PROCEDURE — 88304 TISSUE EXAM BY PATHOLOGIST: CPT | Mod: 26

## 2020-06-08 PROCEDURE — 88304 TISSUE EXAM BY PATHOLOGIST: CPT

## 2020-06-08 PROCEDURE — 69642 REVISE MIDDLE EAR & MASTOID: CPT | Mod: LT

## 2020-06-08 PROCEDURE — 92504 EAR MICROSCOPY EXAMINATION: CPT | Mod: GC

## 2020-06-08 PROCEDURE — L8613: CPT

## 2020-06-08 PROCEDURE — 69646 REVISE MIDDLE EAR & MASTOID: CPT | Mod: LT,GC

## 2020-06-08 PROCEDURE — C1889: CPT

## 2020-06-08 RX ORDER — CEFAZOLIN SODIUM 1 G
2000 VIAL (EA) INJECTION ONCE
Refills: 0 | Status: COMPLETED | OUTPATIENT
Start: 2020-06-08 | End: 2020-06-08

## 2020-06-08 RX ORDER — ACETAMINOPHEN 500 MG
2 TABLET ORAL
Qty: 40 | Refills: 0
Start: 2020-06-08

## 2020-06-08 RX ORDER — IBUPROFEN 200 MG
1 TABLET ORAL
Qty: 20 | Refills: 0
Start: 2020-06-08

## 2020-06-08 RX ORDER — SODIUM CHLORIDE 9 MG/ML
1000 INJECTION, SOLUTION INTRAVENOUS
Refills: 0 | Status: DISCONTINUED | OUTPATIENT
Start: 2020-06-08 | End: 2020-06-23

## 2020-06-08 RX ORDER — BACITRACIN ZINC 500 UNIT/G
1 OINTMENT IN PACKET (EA) TOPICAL
Qty: 1 | Refills: 0
Start: 2020-06-08

## 2020-06-08 RX ORDER — OXYCODONE HYDROCHLORIDE 5 MG/1
5 TABLET ORAL ONCE
Refills: 0 | Status: DISCONTINUED | OUTPATIENT
Start: 2020-06-08 | End: 2020-06-08

## 2020-06-08 RX ORDER — APREPITANT 80 MG/1
40 CAPSULE ORAL ONCE
Refills: 0 | Status: COMPLETED | OUTPATIENT
Start: 2020-06-08 | End: 2020-06-08

## 2020-06-08 RX ADMIN — APREPITANT 40 MILLIGRAM(S): 80 CAPSULE ORAL at 06:42

## 2020-06-08 NOTE — ASU PATIENT PROFILE, ADULT - PMH
Cholesteatoma of middle ear    Conductive hearing loss    H/O chronic otitis media    [place] (hard of hearing)  Conductive hearing loss b/l  Left chronic otitis media    RACHEAL (middle ear effusion)

## 2020-06-08 NOTE — ASU DISCHARGE PLAN (ADULT/PEDIATRIC) - CARE PROVIDER_API CALL
Nirmal Harp)  Otolaryngology  [address] [place] [address]  Phone: [phone]  Fax: [phone]  Follow Up Time:

## 2020-06-08 NOTE — ASU PATIENT PROFILE, ADULT - PSH
H/O tympanomastoidectomy    History of hip surgery  right  S/P myringotomy with insertion of tube  b/l 4yrs ago

## 2020-06-08 NOTE — ASU PATIENT PROFILE, ADULT - ABILITY TO HEAR (WITH HEARING AID OR HEARING APPLIANCE IF NORMALLY USED):
Mildly to Moderately Impaired: difficulty hearing in some environments or speaker may need to increase volume or speak distinctly/hearing aides at home

## 2020-06-08 NOTE — ASU DISCHARGE PLAN (ADULT/PEDIATRIC) - CALL YOUR DOCTOR IF YOU HAVE ANY OF THE FOLLOWING:
Bleeding that does not stop/Numbness, tingling, color or temperature change to extremity/Swelling that gets worse/Pain not relieved by Medications/Nausea and vomiting that does not stop/Wound/Surgical Site with redness, or foul smelling discharge or pus

## 2020-06-20 ENCOUNTER — APPOINTMENT (OUTPATIENT)
Dept: OTOLARYNGOLOGY | Facility: CLINIC | Age: 25
End: 2020-06-20
Payer: COMMERCIAL

## 2020-06-20 VITALS
WEIGHT: 215 LBS | HEIGHT: 71 IN | SYSTOLIC BLOOD PRESSURE: 118 MMHG | HEART RATE: 80 BPM | BODY MASS INDEX: 30.1 KG/M2 | TEMPERATURE: 98.4 F | DIASTOLIC BLOOD PRESSURE: 74 MMHG

## 2020-06-20 PROCEDURE — 99024 POSTOP FOLLOW-UP VISIT: CPT

## 2020-06-23 NOTE — REASON FOR VISIT
[Subsequent Evaluation] : a subsequent evaluation for [FreeTextEntry2] : P/O for left tympanomastoidectomy with ossiculoplasty date of surgery 6/8/20

## 2020-06-23 NOTE — PHYSICAL EXAM
[de-identified] : L PA incision erythema; auricle non edematous; removed packing from CWD cavity and placed wick x 2

## 2020-06-25 ENCOUNTER — APPOINTMENT (OUTPATIENT)
Dept: OTOLARYNGOLOGY | Facility: CLINIC | Age: 25
End: 2020-06-25
Payer: COMMERCIAL

## 2020-06-25 VITALS
DIASTOLIC BLOOD PRESSURE: 66 MMHG | HEIGHT: 71 IN | SYSTOLIC BLOOD PRESSURE: 102 MMHG | HEART RATE: 80 BPM | TEMPERATURE: 98.6 F | BODY MASS INDEX: 30.8 KG/M2 | WEIGHT: 220 LBS

## 2020-06-25 DIAGNOSIS — H60.12 CELLULITIS OF LEFT EXTERNAL EAR: ICD-10-CM

## 2020-06-25 PROCEDURE — 99024 POSTOP FOLLOW-UP VISIT: CPT

## 2020-06-25 NOTE — REASON FOR VISIT
[Subsequent Evaluation] : a subsequent evaluation for [FreeTextEntry2] : s/p Left modified radical tympanomastoidectomy with ossiculoplasty and facial nerve monitoring, 06/08/2020

## 2020-06-25 NOTE — HISTORY OF PRESENT ILLNESS
[de-identified] : 25 year old male follow up s/p Left modified radical tympanomastoidectomy with ossiculoplasty and facial nerve monitoring, 06/08/2020. Reports mild left ear drainage. Denies changes in hearing since the last visit. \par Denies otalgia, tinnitus, fevers

## 2020-06-25 NOTE — PHYSICAL EXAM
[de-identified] : L PA incision less erythema; auricle non edematous; removed packing from CWD cavity and placed wick x 2

## 2020-07-14 ENCOUNTER — APPOINTMENT (OUTPATIENT)
Dept: OTOLARYNGOLOGY | Facility: CLINIC | Age: 25
End: 2020-07-14
Payer: COMMERCIAL

## 2020-07-14 PROCEDURE — 99024 POSTOP FOLLOW-UP VISIT: CPT

## 2020-07-15 NOTE — HISTORY OF PRESENT ILLNESS
[de-identified] : 25 year old male follow up s/p Left modified radical tympanomastoidectomy with ossiculoplasty and facial nerve monitoring, 06/08/2020. Reports mild left ear drainage. Denies changes in hearing since the last visit. \par Denies otalgia, tinnitus, fevers

## 2020-07-15 NOTE — PHYSICAL EXAM
[Binocular Microscopic Exam] : Binocular microscopic exam was performed [Normal] : no rashes [FreeTextEntry9] : wick x2, removed [de-identified] : open cavity, exudate removed, good epithelialization. powder instilled

## 2020-08-27 ENCOUNTER — APPOINTMENT (OUTPATIENT)
Dept: OTOLARYNGOLOGY | Facility: CLINIC | Age: 25
End: 2020-08-27
Payer: COMMERCIAL

## 2020-08-27 VITALS
SYSTOLIC BLOOD PRESSURE: 105 MMHG | HEIGHT: 71 IN | WEIGHT: 223 LBS | BODY MASS INDEX: 31.22 KG/M2 | HEART RATE: 76 BPM | TEMPERATURE: 98.2 F | DIASTOLIC BLOOD PRESSURE: 69 MMHG

## 2020-08-27 PROCEDURE — 92567 TYMPANOMETRY: CPT

## 2020-08-27 PROCEDURE — 92557 COMPREHENSIVE HEARING TEST: CPT

## 2020-08-27 PROCEDURE — 99024 POSTOP FOLLOW-UP VISIT: CPT

## 2020-08-31 NOTE — PHYSICAL EXAM
[de-identified] : L PA incision flat, large meatoplasty, minimal debris removed [Normal] : no rashes

## 2020-08-31 NOTE — REASON FOR VISIT
[Subsequent Evaluation] : a subsequent evaluation for [Family Member] : family member [FreeTextEntry2] : post-op visit from surgery 3 months ago.

## 2020-12-19 ENCOUNTER — APPOINTMENT (OUTPATIENT)
Dept: OTOLARYNGOLOGY | Facility: CLINIC | Age: 25
End: 2020-12-19
Payer: COMMERCIAL

## 2020-12-19 VITALS
HEIGHT: 71 IN | SYSTOLIC BLOOD PRESSURE: 120 MMHG | WEIGHT: 223 LBS | HEART RATE: 81 BPM | DIASTOLIC BLOOD PRESSURE: 80 MMHG | BODY MASS INDEX: 31.22 KG/M2 | TEMPERATURE: 97.2 F

## 2020-12-19 PROCEDURE — 69220 CLEAN OUT MASTOID CAVITY: CPT | Mod: LT

## 2020-12-19 PROCEDURE — 99072 ADDL SUPL MATRL&STAF TM PHE: CPT

## 2020-12-19 PROCEDURE — 99213 OFFICE O/P EST LOW 20 MIN: CPT | Mod: 25

## 2020-12-19 PROCEDURE — 92504 EAR MICROSCOPY EXAMINATION: CPT

## 2020-12-21 NOTE — PROCEDURE
[Same] : same as the Pre Op Dx. [] : Debridement of Mastoid [FreeTextEntry1] : L cholesteatoma [FreeTextEntry4] : none [FreeTextEntry6] : Cerumen and debris were removed under binocular microscopy from mastoid cavity with a combination of a suction, quintana needle, alligator and/or a loop curette. The patient tolerated the procedure well and there were no complications. The included findings were noted.\par

## 2020-12-21 NOTE — REASON FOR VISIT
[Subsequent Evaluation] : a subsequent evaluation for [FreeTextEntry2] : c/o of drainage blood in left ear started 3 weeks ago little pain

## 2020-12-21 NOTE — HISTORY OF PRESENT ILLNESS
[de-identified] : some drainage in am's\par no fevers\par hears better\par does not wear hearing aid

## 2020-12-21 NOTE — PHYSICAL EXAM
[Binocular Microscopic Exam] : Binocular microscopic exam was performed [Hearing Loss Right Only] : normal [Hearing Loss Left Only] : normal [FreeTextEntry9] : wide meatoplasty [de-identified] : some wet exudate, cleared and powder& GV applied [Normal] : no rashes

## 2020-12-23 PROBLEM — H66.92 OTITIS MEDIA, LEFT: Status: RESOLVED | Noted: 2020-03-27 | Resolved: 2020-12-23

## 2021-02-06 ENCOUNTER — APPOINTMENT (OUTPATIENT)
Dept: OTOLARYNGOLOGY | Facility: CLINIC | Age: 26
End: 2021-02-06
Payer: COMMERCIAL

## 2021-02-06 VITALS
TEMPERATURE: 98 F | HEART RATE: 80 BPM | DIASTOLIC BLOOD PRESSURE: 80 MMHG | HEIGHT: 71 IN | WEIGHT: 223 LBS | SYSTOLIC BLOOD PRESSURE: 120 MMHG | BODY MASS INDEX: 31.22 KG/M2

## 2021-02-06 PROCEDURE — 92557 COMPREHENSIVE HEARING TEST: CPT

## 2021-02-06 PROCEDURE — 99072 ADDL SUPL MATRL&STAF TM PHE: CPT

## 2021-02-06 PROCEDURE — 92567 TYMPANOMETRY: CPT

## 2021-02-06 PROCEDURE — 99213 OFFICE O/P EST LOW 20 MIN: CPT | Mod: 25

## 2021-02-06 PROCEDURE — 92504 EAR MICROSCOPY EXAMINATION: CPT

## 2021-02-09 NOTE — PHYSICAL EXAM
[Binocular Microscopic Exam] : Binocular microscopic exam was performed [Normal] : the right mastoid was normal [Hearing Loss Right Only] : normal [Hearing Loss Left Only] : normal [de-identified] : some wet exudate, cleared and powder& GV applied; polyp in SD angle area, cauterized, gelfoam with ciprodex applied. [FreeTextEntry9] : wide meatoplasty

## 2021-02-09 NOTE — REASON FOR VISIT
[Subsequent Evaluation] : a subsequent evaluation for [FreeTextEntry2] : 24yo man with large recurrent cholesteatoma L ear s/p CWD 6 mos ago, cavity appears well healed with mild debris/exudate. f/u3m, can discuss implants/OSIA if pt interested. can wear trad HAfor now.audio and endo follow up.

## 2021-02-09 NOTE — PROCEDURE
[Same] : same as the Pre Op Dx. [] : Debridement of Mastoid [FreeTextEntry4] : none [FreeTextEntry1] : L cholesteatoma [FreeTextEntry6] : Cerumen and debris were removed under binocular microscopy from mastoid cavity with a combination of a suction, quintana needle, alligator and/or a loop curette. The patient tolerated the procedure well and there were no complications. The included findings were noted.\par

## 2021-02-09 NOTE — HISTORY OF PRESENT ILLNESS
[de-identified] : some drainage in am's\par no fevers\par hears better\par does not wear hearing aid

## 2021-03-25 ENCOUNTER — APPOINTMENT (OUTPATIENT)
Dept: OTOLARYNGOLOGY | Facility: CLINIC | Age: 26
End: 2021-03-25
Payer: COMMERCIAL

## 2021-03-25 DIAGNOSIS — H65.91 UNSPECIFIED NONSUPPURATIVE OTITIS MEDIA, RIGHT EAR: ICD-10-CM

## 2021-03-25 PROCEDURE — 92504 EAR MICROSCOPY EXAMINATION: CPT

## 2021-03-25 PROCEDURE — 99072 ADDL SUPL MATRL&STAF TM PHE: CPT

## 2021-03-25 PROCEDURE — 99213 OFFICE O/P EST LOW 20 MIN: CPT | Mod: 25

## 2021-03-25 PROCEDURE — 69220 CLEAN OUT MASTOID CAVITY: CPT | Mod: LT

## 2021-03-26 PROBLEM — H65.91 MIDDLE EAR EFFUSION, RIGHT: Status: ACTIVE | Noted: 2020-03-27

## 2021-03-26 NOTE — HISTORY OF PRESENT ILLNESS
[de-identified] : increase in drainage in am's\par no fevers\par hears the same\par does not wear hearing aid

## 2021-03-26 NOTE — PHYSICAL EXAM
[Binocular Microscopic Exam] : Binocular microscopic exam was performed [Normal] : the right mastoid was normal [Hearing Loss Right Only] : normal [Hearing Loss Left Only] : normal [FreeTextEntry9] : wide meatoplasty [de-identified] : some wet exudate, cleared and powder& GV applied

## 2021-05-13 ENCOUNTER — APPOINTMENT (OUTPATIENT)
Dept: OTOLARYNGOLOGY | Facility: CLINIC | Age: 26
End: 2021-05-13
Payer: COMMERCIAL

## 2021-05-13 VITALS
WEIGHT: 203 LBS | SYSTOLIC BLOOD PRESSURE: 113 MMHG | HEIGHT: 71 IN | HEART RATE: 84 BPM | DIASTOLIC BLOOD PRESSURE: 72 MMHG | BODY MASS INDEX: 28.42 KG/M2

## 2021-05-13 PROCEDURE — 92504 EAR MICROSCOPY EXAMINATION: CPT

## 2021-05-13 PROCEDURE — 99213 OFFICE O/P EST LOW 20 MIN: CPT | Mod: 25

## 2021-05-13 PROCEDURE — 99072 ADDL SUPL MATRL&STAF TM PHE: CPT

## 2021-05-13 RX ORDER — AMOXICILLIN 875 MG/1
875 TABLET, FILM COATED ORAL
Qty: 14 | Refills: 0 | Status: DISCONTINUED | COMMUNITY
Start: 2020-04-14 | End: 2021-05-13

## 2021-05-13 RX ORDER — MUPIROCIN 20 MG/G
2 OINTMENT TOPICAL
Qty: 2 | Refills: 0 | Status: DISCONTINUED | COMMUNITY
Start: 2020-03-31 | End: 2021-05-13

## 2021-05-13 NOTE — REASON FOR VISIT
[Subsequent Evaluation] : a subsequent evaluation for [FreeTextEntry2] : follow up s/p Left modified radical tympanomastoidectomy with ossiculoplasty and facial nerve monitoring 6/8/2020

## 2021-05-13 NOTE — HISTORY OF PRESENT ILLNESS
[de-identified] : 26 year old man follow up s/p Left modified radical tympanomastoidectomy with ossiculoplasty and facial nerve monitoring 6/8/2020.  Denies otorrhea, otalgia.  States feels left ear is a little clogged.  States feels hearing is a little better.  Not currently wearing hearing aids due to otorrhea.   States feels hearing okay without the hearing aids.

## 2021-05-13 NOTE — PHYSICAL EXAM
[Binocular Microscopic Exam] : Binocular microscopic exam was performed [Normal] : the right mastoid was normal [Hearing Loss Right Only] : normal [Hearing Loss Left Only] : normal [FreeTextEntry9] : wide meatoplasty [de-identified] : CWD well epithelialized, powder cleared; ointment applied

## 2021-11-16 ENCOUNTER — APPOINTMENT (OUTPATIENT)
Dept: OTOLARYNGOLOGY | Facility: CLINIC | Age: 26
End: 2021-11-16

## 2022-03-08 ENCOUNTER — APPOINTMENT (OUTPATIENT)
Dept: OTOLARYNGOLOGY | Facility: CLINIC | Age: 27
End: 2022-03-08
Payer: COMMERCIAL

## 2022-03-08 VITALS — BODY MASS INDEX: 30.8 KG/M2 | WEIGHT: 220 LBS | HEIGHT: 71 IN

## 2022-03-08 VITALS — HEART RATE: 70 BPM | SYSTOLIC BLOOD PRESSURE: 125 MMHG | DIASTOLIC BLOOD PRESSURE: 71 MMHG

## 2022-03-08 DIAGNOSIS — H66.93 OTITIS MEDIA, UNSPECIFIED, BILATERAL: ICD-10-CM

## 2022-03-08 PROCEDURE — 92504 EAR MICROSCOPY EXAMINATION: CPT

## 2022-03-08 PROCEDURE — 69220 CLEAN OUT MASTOID CAVITY: CPT | Mod: LT

## 2022-03-08 PROCEDURE — 99213 OFFICE O/P EST LOW 20 MIN: CPT | Mod: 25

## 2022-03-10 NOTE — HISTORY OF PRESENT ILLNESS
[de-identified] : 26 year old man follow up s/p Left modified radical tympanomastoidectomy with ossiculoplasty and facial nerve monitoring 6/8/2020. Patient reports hearing is stable. Patient reports left ear feel a little clogged. Patient has a history of hearing aid-but is not wearing due to white-yellowish otorrhea. Patient reports hearing is okay without hearing aids. Denies otorrhea, otalgia.

## 2022-03-10 NOTE — PHYSICAL EXAM
[Binocular Microscopic Exam] : Binocular microscopic exam was performed [Normal] : the right mastoid was normal [Hearing Loss Right Only] : normal [Hearing Loss Left Only] : normal [FreeTextEntry9] : wide meatoplasty [de-identified] : CWD well epithelialized, powder cleared; ointment applied

## 2023-08-08 ENCOUNTER — APPOINTMENT (OUTPATIENT)
Dept: OTOLARYNGOLOGY | Facility: CLINIC | Age: 28
End: 2023-08-08
Payer: COMMERCIAL

## 2023-08-08 VITALS
DIASTOLIC BLOOD PRESSURE: 82 MMHG | HEART RATE: 78 BPM | SYSTOLIC BLOOD PRESSURE: 120 MMHG | WEIGHT: 228 LBS | HEIGHT: 72 IN | BODY MASS INDEX: 30.88 KG/M2

## 2023-08-08 DIAGNOSIS — H70.12 CHRONIC MASTOIDITIS, LEFT EAR: ICD-10-CM

## 2023-08-08 DIAGNOSIS — H71.92 UNSPECIFIED CHOLESTEATOMA, LEFT EAR: ICD-10-CM

## 2023-08-08 DIAGNOSIS — H90.A12 CONDUCTIVE HEARING LOSS, UNILATERAL, LEFT EAR WITH RESTRICTED HEARING ON THE CONTRALATERAL SIDE: ICD-10-CM

## 2023-08-08 DIAGNOSIS — H90.0 CONDUCTIVE HEARING LOSS, BILATERAL: ICD-10-CM

## 2023-08-08 PROCEDURE — 99214 OFFICE O/P EST MOD 30 MIN: CPT | Mod: 25

## 2023-08-08 PROCEDURE — 69220 CLEAN OUT MASTOID CAVITY: CPT | Mod: LT

## 2023-08-08 PROCEDURE — 92504 EAR MICROSCOPY EXAMINATION: CPT

## 2023-08-08 NOTE — DATA REVIEWED
[de-identified] : An audiogram was ordered and performed including tympanometry, pure tones and speech, for patient's complaint of L cholesteatoma/MRM cavity I have independently reviewed the patient's audiogram from today and my findings include L CHL, stable

## 2023-08-08 NOTE — HISTORY OF PRESENT ILLNESS
[de-identified] : 28 year old man follow up s/p Left modified radical tympanomastoidectomy with ossiculoplasty and facial nerve monitoring 6/8/2020. Patient reports hearing is stable. Patient reports left ear feel a little clogged. Patient has a history of hearing aid-but is not wearing due to white-yellowish otorrhea. Patient reports hearing is okay without hearing aids. Denies otorrhea, otalgia.

## 2023-08-08 NOTE — PHYSICAL EXAM
[Binocular Microscopic Exam] : Binocular microscopic exam was performed [Normal] : the right mastoid was normal [Hearing Loss Right Only] : normal [Hearing Loss Left Only] : normal [FreeTextEntry9] : wide meatoplasty [de-identified] : CWD well epithelialized, cleared from cerumen, powder and ointment applied
